# Patient Record
(demographics unavailable — no encounter records)

---

## 2024-11-11 NOTE — HISTORY OF PRESENT ILLNESS
[FreeTextEntry1] : Follow up for SLE, LN.  PHI: - 3/25/2020 pt went to [facility] ED for 2 day h/o whole body aches, vomiting and abdominal pain. 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 Pt had CT A/P done showing recently ruptured ovarian cyst with slight peritoneal fluid, in addition to trace pericardial effusion. UTI was also diagnosed and pt was discharged on abx, which did not resolve her symptoms. After discharge pt developed 2-3 weeks of low grade fevers (up to T100). Due to ongoing nausea and vomiting in addition to poor appetite, pt lost 28 lb and developed significant hair loss, myalgias and arthralgias. Labs in 5/4/2020 done her her PCP showed TIGIST done 1:320, dsDNA > 300, , CRP 6.4, Hb 8.6 (baseline 12.8), ferritin 438, Fe 12, Cr 0.85, COVID IgG neg, HBV, HCV neg, no urine studies available. For new onset anemia was started on iron. On initial rheumatology visit on 5/28 was dg with SLE, on workup found to have 2.2 g proteinuria. 6/19/2020 Renal biopsy report: 21 glomeruli, 18 are proliferative (crescents or endocapillary proliferation) 9 fibrocellular and 3 fibrous crescents. IF-full house. 20% IFTA. full-house pattern and crescents, high activity (NIH 8/24), mild chronicity (NIH 3/12). IV Solumedrol 500mg IV qd x 3 days given 6/23-6/25/2020. Patient started on MMF and HCQ shortly after and was in clinical and serologic remission up until 2021 (see below) She felt nauseous with MMF so she was trailed on Myfortic in 10/2020 but it made her dizzy and nauseous, so she was switched back to MMF.  Original labs from 2020: U P/C 0.23 (9/16/2020) <- 0.2 (8/11/2020) <- 0.4 (7/10/2020) <- 2.2 (5/28/2020) UA WBC 20-40 RBC 10-20 (8/11/2020) <- bland 7/10/2020 C3 104 (9/16/2020) <- 98 (8/11/2020) <- 81 (7/16/2020) <- 102 (5/28/2020) C4 19 (9/16/2020) <- 16 (8/11/2020) <- 13 (7/16/2020) <- 11 (5/28/2020) dsDNA 23 (9/16/2020) <- 52 (7/16/2020) <- 630 (5/28/2020) Hb 13.1 (9/16/2020) <- 13.4 (8/11/2020) <- 11.5 (7/16/2020) <- 10.7 (7/1/2020) WBC 8.5 (8/11/2020) <- 7.9 (7/16/2020) <- 12 (7/1/2020) Cr 0.7 (9/16/2020) <- 0.69 (8/11/2020) <- 0.72 (7/16/2020) <- 0.71 (7/1/2020) <- 0.85 (5/28/2020)  9/2021 -Pt was lost to follow up from 10/2020 - 9/2021. While she was there she was getting monthly labs sent to me, however was lost to follow up since 12/2021. While in [place] her labs were stable and showed no evidence of lupus activity and normal UA with no proteinuria. She was seeing a rheumatologist there who told her that she can discontinue all of her medications (MMF and HCQ), which she has been off for the past 6months. In the past few weeks she is reporting recurrence of bubbles in her urine. She is also reporting ongoing intermittent right sided flank pain comes up at times of stress. She went to see her PCP who did labs Cr 0.7, UA prot 70; dsDNA 75.  10/28/2021 visit Patient received first dose of Pfizer Covid vaccine in 10/11/2021. 3 days later she developed complete numbness over the right side of her body from head to toe. The numbness has not improved over time. She is very upset at today's encounter, crying for most of the encounter. She is concerned because for work she needs to be fully vaccinated to work. MMF 2g was restarted at last visit. Repeat UP/C undetectable.  1/2022 Pt had COVID infection in 12/2021. She has recovered. She has been off of MMF for over a month., restarted it last week. She has remained on HCQ. Paresthesias from COVID vaccine (1st dose) resolved. Pt took 2nd dose without complications.  4/2022 Patient admits to not taking mycophenolate 2g as instructed. She often takes 500mg or 1g. She has remained on HCQ.. Labs from 4/2 reviewed showing 0.4g proteinuria, Cr 0.6, Plt 123 Past week with bilious vomiting and intermittent RUQ pain. Today feeling better Taking Ashgawanda  5/2022 Patient feeling great. On MMF 2g, HCQ 400mg daily. No abdominal pain. No rash, oral ulcers, hair loss, joint pain, leg swelling, SOB, CP, abdominal complaints. US abdomen demonstrating hepatomegaly, LFTs wnl  9/2022 Feels well. on MMF 2g not 3g HCQ 400mg daily. no rash, photosensitivity, hair loss, oral or nasal ulcers, dry eyes or mouth, joint pain or swelling, fatigue, muscle weakness, fevers, chills, weight loss, Raynauds, numbness, tingling respiratory or gastrointestinal complaints  1/17/2023 pt has been on MMF 1g -2 g, does not feel good on 2g, gets RUQ tenderness when she takes 2g and mild nausea. No hair loss, rash, oral/nasal ulcers, joint pain.  6/8/2023 Tried taking 3 tabs / 2 tabs mmf but felt numbness in her body so dropped to 2 tabs bid. Feeling well. no rash, ulcers, joint pain, abdominal pain, leg swelling.  PMH: subclinical hypothyroidism Obstetric hx: 1 child, no pre-eclampsia or other complications; no miscarriages SH: Pt works as a physical therapist in [place]; had IUD x 5 years, which she removed 1 month ago FH: grandmother with lupus  Meds HCQ 200mg qd MMF 1000mg bid    The last clinic visit was 3 month(s) ago.  Interval History: 6/4/2024 doing well, no complaints. On plaquenil 200mg qd, cellcept 1g qd. No alopecia, fever, rash, chest pain, sob, or joint pain/swelling. No abdominal pain. 3/2024 On plaquenil 200mg qd, cellcept 2g qd. No alopecia, fever, rash, chest pain, sob, or joint pain/swelling. Reports occasional RUQ discomfort after fatty meals. 12/2023 No active issues. On plaquenil 200mg qd, cellcept 2g qd. No alopecia, fever, rash, chest pain, sob, or joint pain/swelling 9/2023: Pt says she feels well. On plaquenil 200mg qd, cellcept 2g qd. No alopecia, fever, rash, chest pain, sob, or joint pain/swelling  current treatments:  No fevers, h/a, rashes, hair loss, oral ulcers, epistaxis, sinusitis,  swollen glands, dry mouth, dry eyes, CP, SOB, cough, vision changes, abdominal pain, GERD, n/v/d, blood in stool or urine, focal weakness, sensory loss,  Raynaud's, joint pain, swelling, weight loss.  pt taking plaquenil 200mg daily  cellcept 500mg BID

## 2024-11-11 NOTE — DATA REVIEWED
[FreeTextEntry1] : Labs reviewed from 9/24 urine protein/cr 0.4 UA protein 100, mod blood, LE mod, WBC 200s, RBC 17, bacteria many, epithelial cells 21 plts 141 DsDNA 39  ACCESSION No:  10 [id number]  AMILCAR ANDRES                     3    Surgical Final Report     Final Diagnosis Kidney, needle core biopsy  Lupus nephritis, with healing and healed crescents, diffuse proliferative and sclerosing type, ISN/RPS 2003 class IV-G (A/C) - Proliferative changes are seen in 75% glomeruli, with fibrocellular crescents in 43% glomeruli - NIH activity index 8/24  Summary of chronic changes: - No global glomerulosclerosis - Segmental glomerulosclerosis, involving about 40% of glomeruli - Tubular atrophy and interstitial fibrosis (20% of cortex) - Mild arterial and arteriolar sclerosis - NIH chronicity index 3/12  Comment: The preliminary findings were communicated via email to Dr. Chavira on 6/22/2020 at 11:30AM.  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 The overall pattern of injury is best summarized under the ISN/RPS 2003 class IV- diffuse proliferative and sclerosing lupus nephritis.  Reference: Donell JJ, Ethan VD, Bowling MM, et al: The classification of glomerulonephritis in systemic lupus erythematosus revisited.  [employer] Vol.65, [id number], 2004.  Verified by: Enedina Atkins MD (Electronic Signature) Reported on: 06/24/20 12:08 EDT, [facility], [address] [place] [address] Phone: [phone]   Fax: [phone] _________________________________________________________________  Microscopic Description  1. Light Microscopy: Sections of formalin-fixed, paraffin embedded tissue were evaluated using H&E, PAS, JMS, and trichrome stains. An H&E- stained frozen section taken from the tissue allocated for immunofluorescence microscopy and semi-thin toluidine blue- stained epoxy sections of the tissue processed for electron microscopy were also evaluated using light microscopy.      OUAHIBI, FETTOUMA                     3    Surgical Final Report      The sample submitted for light microscopy consists of renal cortex and medulla, with up to 21 glomeruli. The entire biopsy contains 26 glomeruli (LM-21; IF-2; EM-3), none of which are globally sclerosed. The glomeruli are enlarged and about 75% reveal segmental to global hypercellularity, with fibrocellular crescents in 43% glomeruli, often in association with segmental sclerosis of the glomerular tuft. Benavidez's capsule appears disrupted in affected glomeruli, often with associated periglomerular inflammation. There are 3 glomeruli with fibrous / healed crescents. The glomerular capillary loops are irregularly thickened. Segmental endocapillary proliferation is seen in several glomeruli. Tubules reveal focal degenerative changes and flattening of the epithelium. The interstitium contains mild inflammatory infiltrates, mainly in periglomerular areas. Approximately 20% of the renal cortex shows tubular atrophy and interstitial fibrosis. Arteries and arterioles show mild sclerosis.  Activity Index: 8/24 Endocapillary hypercellularity (0-3+) = 2 Neutrophil infiltration / karyorrhexis (0-3+) = 1 Subendothelial hyaline deposits (0-3+) = 0 Fibrinoid necrosis (0-3+)x2 = 0 Cellular / fibrocellular crescents (0-3+)x2 = 4 Interstitial inflammation (0-3+) = 1  Chronicity Index: 3/12 Glomerulosclerosis (0-3+) = 0 Fibrous crescents (0-3+) = 1 Tubular atrophy (0-3+) = 1 Interstitial fibrosis (0-3+) = 1  2. Immunofluorescence Microscopy: The sections of the sample submitted for immunofluorescence studies were incubated with antibodies specific for the heavy chains of IgG, IgA, and IgM, for kappa and lambda light chains, fibrin, albumin, and complement components C3 and C1q. The intensity of immunofluorescence reactivity is expressed on a scale 1- -4+.  The sample contains 2 partial glomeruli. There is finely granular reactivity for IgG (3+), IgA (2+), IgM (2+), C3 (trace) and C1q (3+) in the mesangium. Fibrin deposits are present segmentally in 2 glomeruli, representing inflammation or crescents. Tubular basement membranes show focal finely granular deposition of IgG (1+).       OUAHIBI, FETTOUMA                     3    Surgical Final Report     Tubules contain few intraluminal casts reactive for polyclonal IgA. The interstitium reveals scattered fibrin deposits. There is no difference in reactivity between kappa and lambda light chains in the glomeruli, tubular casts, or in the background of the tissue.  3. Electron Microscopy: The sample submitted for electron microscopy examination contains 3 glomeruli; 2 glomeruli are examined ultrastructurally. The glomerular visceral epithelial cells reveal moderate effacement of their foot processes. The glomerular basement membranes are irregularly thickened and sometimes reveal the presence of subendothelial electron dense deposits. The endothelial cells show focal non-specific changes. Tubuloreticular inclusions are seen within the cytoplasm of some glomerular endothelial cells. The mesangium reveals increased cellularity and a mildly increased amount of matrix with frequent electron dense deposits.  Clinical History A 38-year-old female with new diagnosis of SLE with 2.2 gms proteinuria / no hematuria. Some recent NSAID use.  Specimen(s) Submitted Needle core biopsy left kidney  Gross Description The specimen is received unfixed during specimen adequacy procedure,  labeled as: Needle core biopsy left kidney.  It consists of two fragments of cylindrical pink-tan soft tissue measuring 0.7 cm and 1.5 cm in length and 0.1 cm in diameter. A portion of the specimen is submitted for electron microscopy and immunofluorescence microscopy studies, and the rest of the specimen is entirely submitted in one cassette for paraffin processing.  In addition to other data that may appear on the specimen container, the label has been inspected to confirm the presence of the patient's name and date of birth. Shiela Ambrosio 06/19/2020 13:55

## 2024-11-11 NOTE — DATA REVIEWED
[FreeTextEntry1] : Labs reviewed from 9/24 urine protein/cr 0.4 UA protein 100, mod blood, LE mod, WBC 200s, RBC 17, bacteria many, epithelial cells 21 plts 141 DsDNA 39  ACCESSION No:  10 [id number]  AMILCAR ANDRES                     3    Surgical Final Report     Final Diagnosis Kidney, needle core biopsy  Lupus nephritis, with healing and healed crescents, diffuse proliferative and sclerosing type, ISN/RPS 2003 class IV-G (A/C) - Proliferative changes are seen in 75% glomeruli, with fibrocellular crescents in 43% glomeruli - NIH activity index 8/24  Summary of chronic changes: - No global glomerulosclerosis - Segmental glomerulosclerosis, involving about 40% of glomeruli - Tubular atrophy and interstitial fibrosis (20% of cortex) - Mild arterial and arteriolar sclerosis - NIH chronicity index 3/12  Comment: The preliminary findings were communicated via email to Dr. Chavira on 6/22/2020 at 11:30AM.  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 The overall pattern of injury is best summarized under the ISN/RPS 2003 class IV- diffuse proliferative and sclerosing lupus nephritis.  Reference: Donell JJ, Ethan VD, Bowling MM, et al: The classification of glomerulonephritis in systemic lupus erythematosus revisited.  [employer] Vol.65, [id number], 2004.  Verified by: Enedina Atkins MD (Electronic Signature) Reported on: 06/24/20 12:08 EDT, [facility], [address] [place] [address] Phone: [phone]   Fax: [phone] _________________________________________________________________  Microscopic Description  1. Light Microscopy: Sections of formalin-fixed, paraffin embedded tissue were evaluated using H&E, PAS, JMS, and trichrome stains. An H&E- stained frozen section taken from the tissue allocated for immunofluorescence microscopy and semi-thin toluidine blue- stained epoxy sections of the tissue processed for electron microscopy were also evaluated using light microscopy.      OUAHIBI, FETTOUMA                     3    Surgical Final Report      The sample submitted for light microscopy consists of renal cortex and medulla, with up to 21 glomeruli. The entire biopsy contains 26 glomeruli (LM-21; IF-2; EM-3), none of which are globally sclerosed. The glomeruli are enlarged and about 75% reveal segmental to global hypercellularity, with fibrocellular crescents in 43% glomeruli, often in association with segmental sclerosis of the glomerular tuft. Benavidez's capsule appears disrupted in affected glomeruli, often with associated periglomerular inflammation. There are 3 glomeruli with fibrous / healed crescents. The glomerular capillary loops are irregularly thickened. Segmental endocapillary proliferation is seen in several glomeruli. Tubules reveal focal degenerative changes and flattening of the epithelium. The interstitium contains mild inflammatory infiltrates, mainly in periglomerular areas. Approximately 20% of the renal cortex shows tubular atrophy and interstitial fibrosis. Arteries and arterioles show mild sclerosis.  Activity Index: 8/24 Endocapillary hypercellularity (0-3+) = 2 Neutrophil infiltration / karyorrhexis (0-3+) = 1 Subendothelial hyaline deposits (0-3+) = 0 Fibrinoid necrosis (0-3+)x2 = 0 Cellular / fibrocellular crescents (0-3+)x2 = 4 Interstitial inflammation (0-3+) = 1  Chronicity Index: 3/12 Glomerulosclerosis (0-3+) = 0 Fibrous crescents (0-3+) = 1 Tubular atrophy (0-3+) = 1 Interstitial fibrosis (0-3+) = 1  2. Immunofluorescence Microscopy: The sections of the sample submitted for immunofluorescence studies were incubated with antibodies specific for the heavy chains of IgG, IgA, and IgM, for kappa and lambda light chains, fibrin, albumin, and complement components C3 and C1q. The intensity of immunofluorescence reactivity is expressed on a scale 1- -4+.  The sample contains 2 partial glomeruli. There is finely granular reactivity for IgG (3+), IgA (2+), IgM (2+), C3 (trace) and C1q (3+) in the mesangium. Fibrin deposits are present segmentally in 2 glomeruli, representing inflammation or crescents. Tubular basement membranes show focal finely granular deposition of IgG (1+).       OUAHIBI, FETTOUMA                     3    Surgical Final Report     Tubules contain few intraluminal casts reactive for polyclonal IgA. The interstitium reveals scattered fibrin deposits. There is no difference in reactivity between kappa and lambda light chains in the glomeruli, tubular casts, or in the background of the tissue.  3. Electron Microscopy: The sample submitted for electron microscopy examination contains 3 glomeruli; 2 glomeruli are examined ultrastructurally. The glomerular visceral epithelial cells reveal moderate effacement of their foot processes. The glomerular basement membranes are irregularly thickened and sometimes reveal the presence of subendothelial electron dense deposits. The endothelial cells show focal non-specific changes. Tubuloreticular inclusions are seen within the cytoplasm of some glomerular endothelial cells. The mesangium reveals increased cellularity and a mildly increased amount of matrix with frequent electron dense deposits.  Clinical History A 38-year-old female with new diagnosis of SLE with 2.2 gms proteinuria / no hematuria. Some recent NSAID use.  Specimen(s) Submitted Needle core biopsy left kidney  Gross Description The specimen is received unfixed during specimen adequacy procedure,  labeled as: Needle core biopsy left kidney.  It consists of two fragments of cylindrical pink-tan soft tissue measuring 0.7 cm and 1.5 cm in length and 0.1 cm in diameter. A portion of the specimen is submitted for electron microscopy and immunofluorescence microscopy studies, and the rest of the specimen is entirely submitted in one cassette for paraffin processing.  In addition to other data that may appear on the specimen container, the label has been inspected to confirm the presence of the patient's name and date of birth. Shiela Ambrosio 06/19/2020 13:55

## 2024-11-11 NOTE — PHYSICAL EXAM
[General Appearance - Well Nourished] : well nourished [General Appearance - Well Developed] : well developed [Sclera] : the sclera and conjunctiva were normal [Auscultation Breath Sounds / Voice Sounds] : lungs were clear to auscultation bilaterally [Heart Rate And Rhythm] : heart rate was normal and rhythm regular [Heart Sounds] : normal S1 and S2 [Murmurs] : no murmurs [Abdomen Soft] : soft [Abdomen Tenderness] : non-tender [Cervical Lymph Nodes Enlarged Posterior Bilaterally] : posterior cervical [Supraclavicular Lymph Nodes Enlarged Bilaterally] : supraclavicular [Cervical Lymph Nodes Enlarged Anterior Bilaterally] : anterior cervical [Musculoskeletal - Swelling] : no joint swelling seen [] : no rash [Skin Lesions] : no skin lesions [Oriented To Time, Place, And Person] : oriented to person, place, and time

## 2024-11-11 NOTE — HISTORY OF PRESENT ILLNESS
[FreeTextEntry1] : Follow up for SLE, LN.  PHI: - 3/25/2020 pt went to [facility] ED for 2 day h/o whole body aches, vomiting and abdominal pain. 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 Pt had CT A/P done showing recently ruptured ovarian cyst with slight peritoneal fluid, in addition to trace pericardial effusion. UTI was also diagnosed and pt was discharged on abx, which did not resolve her symptoms. After discharge pt developed 2-3 weeks of low grade fevers (up to T100). Due to ongoing nausea and vomiting in addition to poor appetite, pt lost 28 lb and developed significant hair loss, myalgias and arthralgias. Labs in 5/4/2020 done her her PCP showed TIGIST done 1:320, dsDNA > 300, , CRP 6.4, Hb 8.6 (baseline 12.8), ferritin 438, Fe 12, Cr 0.85, COVID IgG neg, HBV, HCV neg, no urine studies available. For new onset anemia was started on iron. On initial rheumatology visit on 5/28 was dg with SLE, on workup found to have 2.2 g proteinuria. 6/19/2020 Renal biopsy report: 21 glomeruli, 18 are proliferative (crescents or endocapillary proliferation) 9 fibrocellular and 3 fibrous crescents. IF-full house. 20% IFTA. full-house pattern and crescents, high activity (NIH 8/24), mild chronicity (NIH 3/12). IV Solumedrol 500mg IV qd x 3 days given 6/23-6/25/2020. Patient started on MMF and HCQ shortly after and was in clinical and serologic remission up until 2021 (see below) She felt nauseous with MMF so she was trailed on Myfortic in 10/2020 but it made her dizzy and nauseous, so she was switched back to MMF.  Original labs from 2020: U P/C 0.23 (9/16/2020) <- 0.2 (8/11/2020) <- 0.4 (7/10/2020) <- 2.2 (5/28/2020) UA WBC 20-40 RBC 10-20 (8/11/2020) <- bland 7/10/2020 C3 104 (9/16/2020) <- 98 (8/11/2020) <- 81 (7/16/2020) <- 102 (5/28/2020) C4 19 (9/16/2020) <- 16 (8/11/2020) <- 13 (7/16/2020) <- 11 (5/28/2020) dsDNA 23 (9/16/2020) <- 52 (7/16/2020) <- 630 (5/28/2020) Hb 13.1 (9/16/2020) <- 13.4 (8/11/2020) <- 11.5 (7/16/2020) <- 10.7 (7/1/2020) WBC 8.5 (8/11/2020) <- 7.9 (7/16/2020) <- 12 (7/1/2020) Cr 0.7 (9/16/2020) <- 0.69 (8/11/2020) <- 0.72 (7/16/2020) <- 0.71 (7/1/2020) <- 0.85 (5/28/2020)  9/2021 -Pt was lost to follow up from 10/2020 - 9/2021. While she was there she was getting monthly labs sent to me, however was lost to follow up since 12/2021. While in [place] her labs were stable and showed no evidence of lupus activity and normal UA with no proteinuria. She was seeing a rheumatologist there who told her that she can discontinue all of her medications (MMF and HCQ), which she has been off for the past 6months. In the past few weeks she is reporting recurrence of bubbles in her urine. She is also reporting ongoing intermittent right sided flank pain comes up at times of stress. She went to see her PCP who did labs Cr 0.7, UA prot 70; dsDNA 75.  10/28/2021 visit Patient received first dose of Pfizer Covid vaccine in 10/11/2021. 3 days later she developed complete numbness over the right side of her body from head to toe. The numbness has not improved over time. She is very upset at today's encounter, crying for most of the encounter. She is concerned because for work she needs to be fully vaccinated to work. MMF 2g was restarted at last visit. Repeat UP/C undetectable.  1/2022 Pt had COVID infection in 12/2021. She has recovered. She has been off of MMF for over a month., restarted it last week. She has remained on HCQ. Paresthesias from COVID vaccine (1st dose) resolved. Pt took 2nd dose without complications.  4/2022 Patient admits to not taking mycophenolate 2g as instructed. She often takes 500mg or 1g. She has remained on HCQ.. Labs from 4/2 reviewed showing 0.4g proteinuria, Cr 0.6, Plt 123 Past week with bilious vomiting and intermittent RUQ pain. Today feeling better Taking Ashgawanda  5/2022 Patient feeling great. On MMF 2g, HCQ 400mg daily. No abdominal pain. No rash, oral ulcers, hair loss, joint pain, leg swelling, SOB, CP, abdominal complaints. US abdomen demonstrating hepatomegaly, LFTs wnl  9/2022 Feels well. on MMF 2g not 3g HCQ 400mg daily. no rash, photosensitivity, hair loss, oral or nasal ulcers, dry eyes or mouth, joint pain or swelling, fatigue, muscle weakness, fevers, chills, weight loss, Raynauds, numbness, tingling respiratory or gastrointestinal complaints  1/17/2023 pt has been on MMF 1g -2 g, does not feel good on 2g, gets RUQ tenderness when she takes 2g and mild nausea. No hair loss, rash, oral/nasal ulcers, joint pain.  6/8/2023 Tried taking 3 tabs / 2 tabs mmf but felt numbness in her body so dropped to 2 tabs bid. Feeling well. no rash, ulcers, joint pain, abdominal pain, leg swelling.  PMH: subclinical hypothyroidism Obstetric hx: 1 child, no pre-eclampsia or other complications; no miscarriages SH: Pt works as a physical therapist in [place]; had IUD x 5 years, which she removed 1 month ago FH: grandmother with lupus  Meds HCQ 200mg qd MMF 1000mg bid    The last clinic visit was 3 month(s) ago.  Interval History: 6/4/2024 doing well, no complaints. On plaquenil 200mg qd, cellcept 1g qd. No alopecia, fever, rash, chest pain, sob, or joint pain/swelling. No abdominal pain. 3/2024 On plaquenil 200mg qd, cellcept 2g qd. No alopecia, fever, rash, chest pain, sob, or joint pain/swelling. Reports occasional RUQ discomfort after fatty meals. 12/2023 No active issues. On plaquenil 200mg qd, cellcept 2g qd. No alopecia, fever, rash, chest pain, sob, or joint pain/swelling 9/2023: Pt says she feels well. On plaquenil 200mg qd, cellcept 2g qd. No alopecia, fever, rash, chest pain, sob, or joint pain/swelling  current treatments:  No fevers, h/a, rashes, hair loss, oral ulcers, epistaxis, sinusitis,  swollen glands, dry mouth, dry eyes, CP, SOB, cough, vision changes, abdominal pain, GERD, n/v/d, blood in stool or urine, focal weakness, sensory loss,  Raynaud's, joint pain, swelling, weight loss.  pt taking plaquenil 200mg daily  cellcept 500mg BID

## 2024-11-11 NOTE — ASSESSMENT
[FreeTextEntry1] : 42F with SLE, LN class IV = fevers, fatigue, weight loss, hair loss, small pericardial effusion, arthralgias and myalgias, proteinuria w LN class IV   =labs 2020 w high TIGIST, DsDNA =2020 renal bx w subendothelial, full-house pattern and crescents, high activity (NIH 8/24), mild chronicity (NIH 3/12). Class IV.  =attempted to d/c medication and flared, not in clinical remission   Counseled that patient should continue with medication, as SLE has not cure. She has already attempted to stop medication and had lupus nephritis flare. Counseled flares can be severe, w progression of LN, ESRD, death.  Will clarify w Dr. Berg regarding medication duration, but I think pt should c/w hcq and low dose MMF.   Plan -Labs w serologies, inflammatory markers -c/w plaquenil 200mg daily  -c/w MMF 500mg BID RTO 3 months

## 2025-02-10 NOTE — PHYSICAL EXAM
[General Appearance - Well Nourished] : well nourished [General Appearance - Well Developed] : well developed [Sclera] : the sclera and conjunctiva were normal [Auscultation Breath Sounds / Voice Sounds] : lungs were clear to auscultation bilaterally [Heart Rate And Rhythm] : heart rate was normal and rhythm regular [Heart Sounds] : normal S1 and S2 [Murmurs] : no murmurs [Abdomen Soft] : soft [Abdomen Tenderness] : non-tender [Cervical Lymph Nodes Enlarged Posterior Bilaterally] : posterior cervical [Cervical Lymph Nodes Enlarged Anterior Bilaterally] : anterior cervical [Supraclavicular Lymph Nodes Enlarged Bilaterally] : supraclavicular [Musculoskeletal - Swelling] : no joint swelling seen [] : no rash [Skin Lesions] : no skin lesions [Oriented To Time, Place, And Person] : oriented to person, place, and time

## 2025-02-10 NOTE — HISTORY OF PRESENT ILLNESS
[___ Month(s) Ago] : [unfilled] month(s) ago [FreeTextEntry1] : =pt hcq 200mg daily, cellcept 500mg BID; pt saw optho 12/24 =pt on 1/28/25 as pt had hemorrhoidectomy and stop for 1 week  =pt has been feeling =No fevers, rashes, swollen glands, CP, SOB, cough, abdominal pain, n/v/d, focal weakness, sensory loss, Raynaud's, joint pain, swelling

## 2025-02-10 NOTE — ASSESSMENT
[FreeTextEntry1] : 43F with SLE, LN class IV = fevers, fatigue, weight loss, hair loss, small pericardial effusion, arthralgias and myalgias, proteinuria w LN class IV   =labs 2020 w high TIGIST, DsDNA =2020 renal bx w subendothelial, full-house pattern and crescents, high activity (NIH 8/24), mild chronicity (NIH 3/12). Class IV.  =attempted to d/c medication and flared, not in clinical remission   According to 2024 ACR Guidelines for Lupus Nephritis, pt should be on tx for 3 years (which include triple therapy). However, pt insisting on tapering medication. Compromised that since pt has been in remission for over 3 years, we can taper further....   Plan -Labs w serologies, inflammatory markers -c/w plaquenil 200mg daily  -c/w MMF 500mg BID for now (if labs ok, we can taper to 500mg daily...)  RTO 2-3 months

## 2025-05-27 NOTE — ASSESSMENT
[FreeTextEntry1] : 43F with SLE, LN class IV = fevers, fatigue, weight loss, hair loss, small pericardial effusion, arthralgias and myalgias, proteinuria w LN class IV   =labs 2020 w high TIGIST, DsDNA =2020 renal bx w subendothelial, full-house pattern and crescents, high activity (NIH 8/24), mild chronicity (NIH 3/12). Class IV.  =attempted to d/c medication and flared, not in clinical remission   According to 2024 ACR Guidelines for Lupus Nephritis, pt should be on tx for 3 years (which include triple therapy). However, pt insisting on tapering medication. Compromised that since pt has been in remission for over 3 years, we can taper further....   Plan -Labs w serologies, inflammatory markers -c/w plaquenil 200mg daily  -c/w MMF 500mg BID QD for now (if ok, can d/c MMF)  RTO 2 months  DISPLAY PLAN FREE TEXT

## 2025-05-27 NOTE — HISTORY OF PRESENT ILLNESS
[___ Month(s) Ago] : [unfilled] month(s) ago [FreeTextEntry1] : =pt taking mycophenolate 500mg QD, plaquenil 200mg daily =pt had cold, and stop 1 week  =No fevers, rashes, swollen glands, CP, SOB, cough, abdominal pain, n/v/d, focal weakness, sensory loss, Raynaud's, joint pain, swelling

## 2025-05-27 NOTE — ASSESSMENT
[FreeTextEntry1] : 43F with SLE, LN class IV = fevers, fatigue, weight loss, hair loss, small pericardial effusion, arthralgias and myalgias, proteinuria w LN class IV   =labs 2020 w high TIGIST, DsDNA =2020 renal bx w subendothelial, full-house pattern and crescents, high activity (NIH 8/24), mild chronicity (NIH 3/12). Class IV.  =attempted to d/c medication and flared, not in clinical remission   According to 2024 ACR Guidelines for Lupus Nephritis, pt should be on tx for 3 years (which include triple therapy). However, pt insisting on tapering medication. Compromised that since pt has been in remission for over 3 years, we can taper further....   Plan -Labs w serologies, inflammatory markers -c/w plaquenil 200mg daily  -c/w MMF 500mg BID QD for now (if ok, can d/c MMF)  RTO 2 months

## 2025-07-07 NOTE — ASSESSMENT
[FreeTextEntry1] : 43F with SLE, LN class IV = fevers, fatigue, weight loss, hair loss, small pericardial effusion, arthralgias and myalgias, proteinuria w LN class IV   =labs 2020 w high TIGIST, DsDNA =2020 renal bx w subendothelial, full-house pattern and crescents, high activity (NIH 8/24), mild chronicity (NIH 3/12). Class IV.  =attempted to d/c medication and flared, not in clinical remission   Will c/w taper cellcept. Will d/c cellcept if labs ok.    Plan -Labs w serologies, inflammatory markers -c/w plaquenil 200mg daily  -c/w MMF 500mg QD RTO 2 months (pt leaving country)

## 2025-07-07 NOTE — HISTORY OF PRESENT ILLNESS
[___ Month(s) Ago] : [unfilled] month(s) ago [FreeTextEntry1] : =pt hcq 200mg daily, mycophonalate 500mg  =no fevers, SOB, CP, abdominal pain, n/v/d, rashes, joint pain, swelling